# Patient Record
Sex: FEMALE | Race: WHITE | URBAN - METROPOLITAN AREA
[De-identification: names, ages, dates, MRNs, and addresses within clinical notes are randomized per-mention and may not be internally consistent; named-entity substitution may affect disease eponyms.]

---

## 2017-06-05 ENCOUNTER — HOSPITAL ENCOUNTER (EMERGENCY)
Facility: CLINIC | Age: 22
Discharge: HOME | End: 2017-06-06
Attending: FAMILY MEDICINE | Admitting: FAMILY MEDICINE
Payer: COMMERCIAL

## 2017-06-05 DIAGNOSIS — R55 SYNCOPE AND COLLAPSE: ICD-10-CM

## 2017-06-05 LAB
ALBUMIN SERPL-MCNC: 3.9 G/DL (ref 3.4–5)
ALBUMIN UR-MCNC: NEGATIVE MG/DL
ALP SERPL-CCNC: 53 U/L (ref 40–150)
ALT SERPL W P-5'-P-CCNC: 34 U/L (ref 0–50)
ANION GAP SERPL CALCULATED.3IONS-SCNC: 7 MMOL/L (ref 3–14)
APPEARANCE UR: CLEAR
APTT PPP: 25 SEC (ref 22–37)
AST SERPL W P-5'-P-CCNC: 61 U/L (ref 0–45)
BASOPHILS # BLD AUTO: 0 10E9/L (ref 0–0.2)
BASOPHILS NFR BLD AUTO: 0.3 %
BILIRUB SERPL-MCNC: 1.4 MG/DL (ref 0.2–1.3)
BILIRUB UR QL STRIP: NEGATIVE
BUN SERPL-MCNC: 11 MG/DL (ref 7–30)
CALCIUM SERPL-MCNC: 8.8 MG/DL (ref 8.5–10.1)
CHLORIDE SERPL-SCNC: 106 MMOL/L (ref 94–109)
CO2 SERPL-SCNC: 24 MMOL/L (ref 20–32)
COLOR UR AUTO: NORMAL
CREAT SERPL-MCNC: 0.66 MG/DL (ref 0.52–1.04)
DIFFERENTIAL METHOD BLD: NORMAL
EOSINOPHIL # BLD AUTO: 0.1 10E9/L (ref 0–0.7)
EOSINOPHIL NFR BLD AUTO: 1 %
ERYTHROCYTE [DISTWIDTH] IN BLOOD BY AUTOMATED COUNT: 12.9 % (ref 10–15)
GFR SERPL CREATININE-BSD FRML MDRD: ABNORMAL ML/MIN/1.7M2
GLUCOSE BLDC GLUCOMTR-MCNC: 88 MG/DL (ref 70–99)
GLUCOSE SERPL-MCNC: 80 MG/DL (ref 70–99)
GLUCOSE UR STRIP-MCNC: NEGATIVE MG/DL
HCG UR QL: NEGATIVE
HCT VFR BLD AUTO: 37.2 % (ref 35–47)
HGB BLD-MCNC: 12.3 G/DL (ref 11.7–15.7)
HGB UR QL STRIP: NEGATIVE
IMM GRANULOCYTES # BLD: 0 10E9/L (ref 0–0.4)
IMM GRANULOCYTES NFR BLD: 0.3 %
INR PPP: 1.02 (ref 0.86–1.14)
KETONES UR STRIP-MCNC: NEGATIVE MG/DL
LEUKOCYTE ESTERASE UR QL STRIP: NEGATIVE
LYMPHOCYTES # BLD AUTO: 1.3 10E9/L (ref 0.8–5.3)
LYMPHOCYTES NFR BLD AUTO: 18.6 %
MAGNESIUM SERPL-MCNC: 2.3 MG/DL (ref 1.6–2.3)
MCH RBC QN AUTO: 28.5 PG (ref 26.5–33)
MCHC RBC AUTO-ENTMCNC: 33.1 G/DL (ref 31.5–36.5)
MCV RBC AUTO: 86 FL (ref 78–100)
MONOCYTES # BLD AUTO: 0.6 10E9/L (ref 0–1.3)
MONOCYTES NFR BLD AUTO: 8.3 %
NEUTROPHILS # BLD AUTO: 5.2 10E9/L (ref 1.6–8.3)
NEUTROPHILS NFR BLD AUTO: 71.5 %
NITRATE UR QL: NEGATIVE
NRBC # BLD AUTO: 0 10*3/UL
NRBC BLD AUTO-RTO: 0 /100
PH UR STRIP: 6.5 PH (ref 5–7)
PLATELET # BLD AUTO: 190 10E9/L (ref 150–450)
POTASSIUM SERPL-SCNC: 4.7 MMOL/L (ref 3.4–5.3)
PROT SERPL-MCNC: 8.1 G/DL (ref 6.8–8.8)
RBC # BLD AUTO: 4.31 10E12/L (ref 3.8–5.2)
SODIUM SERPL-SCNC: 136 MMOL/L (ref 133–144)
SP GR UR STRIP: 1 (ref 1–1.03)
URN SPEC COLLECT METH UR: NORMAL
UROBILINOGEN UR STRIP-MCNC: NORMAL MG/DL (ref 0–2)
WBC # BLD AUTO: 7.2 10E9/L (ref 4–11)

## 2017-06-05 PROCEDURE — 85610 PROTHROMBIN TIME: CPT | Performed by: FAMILY MEDICINE

## 2017-06-05 PROCEDURE — 96361 HYDRATE IV INFUSION ADD-ON: CPT | Performed by: FAMILY MEDICINE

## 2017-06-05 PROCEDURE — 85730 THROMBOPLASTIN TIME PARTIAL: CPT | Performed by: FAMILY MEDICINE

## 2017-06-05 PROCEDURE — 81003 URINALYSIS AUTO W/O SCOPE: CPT | Performed by: FAMILY MEDICINE

## 2017-06-05 PROCEDURE — 93010 ELECTROCARDIOGRAM REPORT: CPT | Mod: Z6 | Performed by: FAMILY MEDICINE

## 2017-06-05 PROCEDURE — 93005 ELECTROCARDIOGRAM TRACING: CPT | Performed by: FAMILY MEDICINE

## 2017-06-05 PROCEDURE — 83735 ASSAY OF MAGNESIUM: CPT | Performed by: FAMILY MEDICINE

## 2017-06-05 PROCEDURE — 80053 COMPREHEN METABOLIC PANEL: CPT | Performed by: FAMILY MEDICINE

## 2017-06-05 PROCEDURE — 00000146 ZZHCL STATISTIC GLUCOSE BY METER IP

## 2017-06-05 PROCEDURE — 99285 EMERGENCY DEPT VISIT HI MDM: CPT | Mod: 25 | Performed by: FAMILY MEDICINE

## 2017-06-05 PROCEDURE — 25000128 H RX IP 250 OP 636: Performed by: FAMILY MEDICINE

## 2017-06-05 PROCEDURE — 85025 COMPLETE CBC W/AUTO DIFF WBC: CPT | Performed by: FAMILY MEDICINE

## 2017-06-05 PROCEDURE — 96360 HYDRATION IV INFUSION INIT: CPT | Performed by: FAMILY MEDICINE

## 2017-06-05 PROCEDURE — 81025 URINE PREGNANCY TEST: CPT | Performed by: FAMILY MEDICINE

## 2017-06-05 RX ORDER — LIDOCAINE 40 MG/G
CREAM TOPICAL
Status: DISCONTINUED | OUTPATIENT
Start: 2017-06-05 | End: 2017-06-06 | Stop reason: HOSPADM

## 2017-06-05 RX ORDER — SODIUM CHLORIDE 9 MG/ML
1000 INJECTION, SOLUTION INTRAVENOUS CONTINUOUS
Status: DISCONTINUED | OUTPATIENT
Start: 2017-06-05 | End: 2017-06-06 | Stop reason: HOSPADM

## 2017-06-05 RX ADMIN — SODIUM CHLORIDE 1000 ML: 9 INJECTION, SOLUTION INTRAVENOUS at 23:48

## 2017-06-05 ASSESSMENT — ENCOUNTER SYMPTOMS
HEADACHES: 0
COUGH: 0
FEVER: 0
SHORTNESS OF BREATH: 0
SEIZURES: 1
ABDOMINAL PAIN: 0
RHINORRHEA: 0
WEAKNESS: 1
LIGHT-HEADEDNESS: 1

## 2017-06-05 NOTE — ED AVS SNAPSHOT
[facility], [place], Emergency Department    [address] [place] [address]    Phone:  [phone]                                       Jinny Alarcon   MRN: [id number]    Department:  [facility], Emergency Department   Date of Visit:  6/5/2017           After Visit Summary Signature Page     I have received my discharge instructions, and my questions have been answered. I have discussed any challenges I see with this plan with the nurse or doctor.    ..........................................................................................................................................  Patient/Patient Representative Signature      ..........................................................................................................................................  Patient Representative Print Name and Relationship to Patient    ..................................................               ................................................  Date                                            Time    ..........................................................................................................................................  Reviewed by Signature/Title    ...................................................              ..............................................  Date                                                            Time

## 2017-06-05 NOTE — ED AVS SNAPSHOT
[facility], Emergency Department    [address] [facility] [address]    Phone:  [phone]                                       Jinny Alarcon   MRN: [id number]    Department:  [facility], Emergency Department   Date of Visit:  6/5/2017           Patient Information     Date Of Birth          1995        Your diagnoses for this visit were:     Syncope and collapse        You were seen by Juancho Jones MD.      Follow-up Information     Follow up with [facility].    Specialty:  Pediatrics    Contact information:    [address]  [place] [address] [phone]          Discharge Instructions       Home.  You were seen by neurology in the ER who felt symptoms were more from a fainting episode.  Ct head negative along with labs and ekg.  Recommend home with rest and make sure to eat well and encourage fluids.  Monitor symptoms.  Call MD if any recurrent problems at all or return.      Causes of Syncope  Syncope (fainting) has many causes. Sometimes it is not serious. In other cases, syncope is a sign of a heart problem. But treatment can help    When syncope is not serious  Your doctor may call your problem vasovagal syncope or orthostatic hypotension. These 2 types of syncope are not serious. They can be caused by:    Strong feelings, such as anxiety or fear. A nerve signal may briefly change your heart rate and lower your blood pressure too much.    Standing for too long. Standing may cause blood to pool in your legs. When this happens, your brain may not receive all the blood it needs.    Standing up too quickly. Your blood pressure may not adjust fast enough to changes in posture and may drop too low. Certain medications can also cause this problem.  When heart trouble causes syncope  A heart problem can decrease the amount of oxygen-rich blood that reaches the brain. Heart trouble can be serious and may even be fatal if left untreated:    A slow heart  rate. Electrical signals tell the chambers of the heart when to pump. But the signals may be slowed or blocked (heart block) as they travel on the heart s pathways. This can be caused by aging, scarred heart tissue, or damage from heart disease. When the heart rate slows, not enough blood is pumped.    A fast heart rate. Certain problems can make the heart race. For instance, after a heart attack, also known as acute myocardial infarction, or AMI, abnormal electrical signals may be created. These signals can make the heart suddenly beat very fast. The heart pumps before the chambers can fill with blood. So less blood reaches the brain and other parts of the body. Illegal drugs, certain medications, heart disease, or an inherited condition can also cause this.    A heart valve problem. Blood travels through the chambers of the heart as it is pumped. Heart valves open and close to help move blood in the right direction. But a valve may not open or close fully, if it s hardened or scarred. As a result, less blood is pumped through the heart to the brain and body.    4272-1889 The [employer]. [address] [place], [place] [address]. All rights reserved. This information is not intended as a substitute for professional medical care. Always follow your healthcare professional's instructions.          Treating Syncope: Prevention  If you have been told that your fainting is not caused by a heart problem, you can help prevent fainting. And you can learn to respond to your body s warning signs.     For your safety and the safety of others, limit your driving as instructed.         If you feel faint, lie down for a while. Sit down and put your head between your knees if you can't lie down.     If You Feel Faint    Know the warning signs of fainting: weakness, nausea, dimmed vision, sweating, lightheadedness, or a fast heartbeat.    Don t ignore or fight any signs that you may faint.    Lie down until you feel  better. Your symptoms should go away in about 20 to 30 minutes.  Small Changes Make a Big Difference    Sit near the aisle so you can leave if you feel faint.    Get up slowly after you have been lying down.    If prescribed, wear special stockings to keep blood from pooling in your legs.    If directed, add salt to your food to raise your blood pressure. Don t skip meals.    Don t stand for long. Shop when lines are short.    Drink water often, especially when exercising during hot weather.  The Role of Medications  Medications sometimes can play a role in both causing and preventing syncope.  Your medications may be changed or reduced. Blood pressure medications may cause fainting. Certain combinations of medications may also make you faint. And some nonprescription medications, herbs, and teas may cause symptoms, too. Tell your doctor about any medication you re taking and if you started using a new medication recently.  Medications may be prescribed. Taking certain medications can help prevent fainting. Your doctor can discuss these with you.     3447-6467 The [employer]. [address] [place] [address], [place] [address]. All rights reserved. This information is not intended as a substitute for professional medical care. Always follow your healthcare professional's instructions.          24 Hour Appointment Hotline       To make an appointment at any [facility], call [phone] ([phone]). If you don't have a family doctor or clinic, we will help you find one. [facility] clinics are conveniently located to serve the needs of you and your family.             Review of your medicines      Notice     You have not been prescribed any medications.            Procedures and tests performed during your visit     CBC with platelets differential    CT Head w/o Contrast    Cardiac Continuous Monitoring    Comprehensive metabolic panel    EKG 12-lead, tracing only    Glucose by meter    HCG qualitative  "urine    INR    Magnesium    Partial thromboplastin time    Peripheral IV catheter    Pulse oximetry nursing    UA reflex to Microscopic and Culture      Orders Needing Specimen Collection     None      Pending Results     Date and Time Order Name Status Description    2017 0046 CT Head w/o Contrast Preliminary     20175 EKG 12-lead, tracing only Preliminary             Pending Culture Results     No orders found for last 3 day(s).            Pending Results Instructions     If you had any lab results that were not finalized at the time of your Discharge, you can call the ED Lab Result RN at [phone]. You will be contacted by this team for any positive Lab results or changes in treatment. The nurses are available 7 days a week from 10A to 6:30P.  You can leave a message 24 hours per day and they will return your call.        Thank you for choosing [facility]       Thank you for choosing [facility] for your care. Our goal is always to provide you with excellent care. Hearing back from our patients is one way we can continue to improve our services. Please take a few minutes to complete the written survey that you may receive in the mail after you visit with us. Thank you!        [employer] Information     [employer] lets you send messages to your doctor, view your test results, renew your prescriptions, schedule appointments and more. To sign up, go to [email or web address] . Click on \"Log in\" on the left side of the screen, which will take you to the Welcome page. Then click on \"Sign up Now\" on the right side of the page.     You will be asked to enter the access code listed below, as well as some personal information. Please follow the directions to create your username and password.     Your access code is: [email or web address]  Expires: 2017  2:31 AM     Your access code will  in 90 days. If you need help or a new code, please call your [facility] clinic or [phone].        Care EveryWhere ID     This " is your Care EveryWhere ID. This could be used by other organizations to access your [facility] medical records  [id number]        After Visit Summary       This is your record. Keep this with you and show to your community pharmacist(s) and doctor(s) at your next visit.

## 2017-06-06 ENCOUNTER — APPOINTMENT (OUTPATIENT)
Dept: CT IMAGING | Facility: CLINIC | Age: 22
End: 2017-06-06
Attending: FAMILY MEDICINE
Payer: COMMERCIAL

## 2017-06-06 VITALS
BODY MASS INDEX: 19.33 KG/M2 | DIASTOLIC BLOOD PRESSURE: 76 MMHG | WEIGHT: 116 LBS | SYSTOLIC BLOOD PRESSURE: 117 MMHG | TEMPERATURE: 98.6 F | RESPIRATION RATE: 16 BRPM | HEIGHT: 65 IN | OXYGEN SATURATION: 99 % | HEART RATE: 75 BPM

## 2017-06-06 LAB — INTERPRETATION ECG - MUSE: NORMAL

## 2017-06-06 PROCEDURE — 70450 CT HEAD/BRAIN W/O DYE: CPT

## 2017-06-06 ASSESSMENT — ENCOUNTER SYMPTOMS
EYE REDNESS: 0
NERVOUS/ANXIOUS: 0
SLEEP DISTURBANCE: 0
BRUISES/BLEEDS EASILY: 0
NAUSEA: 0
ACTIVITY CHANGE: 1
DIARRHEA: 0
FLANK PAIN: 0
MYALGIAS: 0
DYSPHORIC MOOD: 0
DIFFICULTY URINATING: 0
APPETITE CHANGE: 1
VOMITING: 0
NECK STIFFNESS: 0
PALPITATIONS: 0
FACIAL SWELLING: 0
CHEST TIGHTNESS: 0
JOINT SWELLING: 0
DECREASED CONCENTRATION: 0
CONFUSION: 0
NECK PAIN: 0
DIAPHORESIS: 0
ARTHRALGIAS: 0
FATIGUE: 0
BLOOD IN STOOL: 0
COLOR CHANGE: 0

## 2017-06-06 NOTE — DISCHARGE INSTRUCTIONS
Home.  You were seen by neurology in the ER who felt symptoms were more from a fainting episode.  Ct head negative along with labs and ekg.  Recommend home with rest and make sure to eat well and encourage fluids.  Monitor symptoms.  Call MD if any recurrent problems at all or return.      Causes of Syncope  Syncope (fainting) has many causes. Sometimes it is not serious. In other cases, syncope is a sign of a heart problem. But treatment can help    When syncope is not serious  Your doctor may call your problem vasovagal syncope or orthostatic hypotension. These 2 types of syncope are not serious. They can be caused by:    Strong feelings, such as anxiety or fear. A nerve signal may briefly change your heart rate and lower your blood pressure too much.    Standing for too long. Standing may cause blood to pool in your legs. When this happens, your brain may not receive all the blood it needs.    Standing up too quickly. Your blood pressure may not adjust fast enough to changes in posture and may drop too low. Certain medications can also cause this problem.  When heart trouble causes syncope  A heart problem can decrease the amount of oxygen-rich blood that reaches the brain. Heart trouble can be serious and may even be fatal if left untreated:    A slow heart rate. Electrical signals tell the chambers of the heart when to pump. But the signals may be slowed or blocked (heart block) as they travel on the heart s pathways. This can be caused by aging, scarred heart tissue, or damage from heart disease. When the heart rate slows, not enough blood is pumped.    A fast heart rate. Certain problems can make the heart race. For instance, after a heart attack, also known as acute myocardial infarction, or AMI, abnormal electrical signals may be created. These signals can make the heart suddenly beat very fast. The heart pumps before the chambers can fill with blood. So less blood reaches the brain and other parts of the  body. Illegal drugs, certain medications, heart disease, or an inherited condition can also cause this.    A heart valve problem. Blood travels through the chambers of the heart as it is pumped. Heart valves open and close to help move blood in the right direction. But a valve may not open or close fully, if it s hardened or scarred. As a result, less blood is pumped through the heart to the brain and body.    7247-4593 [employer]. [address] [place] [address], [place] [address]. All rights reserved. This information is not intended as a substitute for professional medical care. Always follow your healthcare professional's instructions.          Treating Syncope: Prevention  If you have been told that your fainting is not caused by a heart problem, you can help prevent fainting. And you can learn to respond to your body s warning signs.     For your safety and the safety of others, limit your driving as instructed.         If you feel faint, lie down for a while. Sit down and put your head between your knees if you can't lie down.     If You Feel Faint    Know the warning signs of fainting: weakness, nausea, dimmed vision, sweating, lightheadedness, or a fast heartbeat.    Don t ignore or fight any signs that you may faint.    Lie down until you feel better. Your symptoms should go away in about 20 to 30 minutes.  Small Changes Make a Big Difference    Sit near the aisle so you can leave if you feel faint.    Get up slowly after you have been lying down.    If prescribed, wear special stockings to keep blood from pooling in your legs.    If directed, add salt to your food to raise your blood pressure. Don t skip meals.    Don t stand for long. Shop when lines are short.    Drink water often, especially when exercising during hot weather.  The Role of Medications  Medications sometimes can play a role in both causing and preventing syncope.  Your medications may be changed or reduced. Blood pressure  medications may cause fainting. Certain combinations of medications may also make you faint. And some nonprescription medications, herbs, and teas may cause symptoms, too. Tell your doctor about any medication you re taking and if you started using a new medication recently.  Medications may be prescribed. Taking certain medications can help prevent fainting. Your doctor can discuss these with you.     5666-6247 [employer]. [address] [place] [address], [place] [address]. All rights reserved. This information is not intended as a substitute for professional medical care. Always follow your healthcare professional's instructions.

## 2017-06-06 NOTE — ED PROVIDER NOTES
"  History     Chief Complaint   Patient presents with     Seizures     HPI  Jinny Alarcon is a 22 year old female who presents to the Emergency Department via EMS now here with her mother and father for evaluation after a possible seizure. This evening, the patient was at her apartment cooking when her right hand began to contract. She attributed this to cooking and using the knife with her right hand. She then developed ringing in her ears, lightheadedness and \"everything became bright.\" She called her sister and went to the hallway of her apartment. There she felt her legs weaken and bend. She states she remembers laying in the hallway and being somewhat coherent. When she came to there were people around her in the apartment complex. Witnesses state the patient was standing next to the hairs and fell back and began to convulse and her eyes rolled back. The patient states she does not remember this and does not feel like she had a seizure. When she woke, she felt at baseline. The patient did not eat today other than a cookie. She has never experienced these symptoms before. She denies headache, tongue injury, bladder or bowel incontinence, cough, cold symptoms, neck pain or any other concerns or complaints at this time. No history of seizures or heat diease.  Patient now feels fine. No cardiac sx or hx. Neg family hx.LMP wnl.       Past Medical History:   Diagnosis Date     NO ACTIVE PROBLEMS        Past Surgical History:   Procedure Laterality Date     NO HISTORY OF SURGERY         Family History   Problem Relation Age of Onset     DIABETES Father      Coronary Artery Disease No family hx of      Hyperlipidemia Mother      CEREBROVASCULAR DISEASE No family hx of      Breast Cancer Maternal Aunt      arely ge 50      Other - See Comments Sister      fabi       Social History   Substance Use Topics     Smoking status: Never Smoker     Smokeless tobacco: Never Used     Alcohol use 0.0 - 0.6 oz/week     0 " "- 1 Standard drinks or equivalent per week       No current facility-administered medications for this encounter.      No current outpatient prescriptions on file.        Allergies   Allergen Reactions     Penicillins      I have reviewed the Medications, Allergies, Past Medical and Surgical History, and Social History in the Epic system.    Review of Systems   Constitutional: Positive for activity change (? seizure ax earlier) and appetite change (decrease po today). Negative for diaphoresis, fatigue and fever.   HENT: Positive for tinnitus. Negative for congestion, facial swelling and rhinorrhea.    Eyes: Positive for visual disturbance (hpi). Negative for redness.   Respiratory: Negative for cough, chest tightness and shortness of breath.    Cardiovascular: Negative for chest pain, palpitations and leg swelling.   Gastrointestinal: Negative for abdominal pain, blood in stool, diarrhea, nausea and vomiting.   Genitourinary: Negative for difficulty urinating, flank pain, menstrual problem and vaginal bleeding.   Musculoskeletal: Negative for arthralgias, joint swelling, myalgias, neck pain and neck stiffness.   Skin: Negative for color change.   Neurological: Positive for seizures (possible), weakness (legs) and light-headedness. Negative for headaches.        Negative for bowel or bladder incontinence.   Hematological: Does not bruise/bleed easily.   Psychiatric/Behavioral: Negative for confusion, decreased concentration, dysphoric mood and sleep disturbance. The patient is not nervous/anxious.    All other systems reviewed and are negative.      Physical Exam   BP: 117/76  Pulse: 78  Temp: 98.6  F (37  C)  Resp: 16  Height: 165.1 cm (5' 5\")  Weight: 52.6 kg (116 lb)  SpO2: 100 %  Physical Exam   Constitutional: She is oriented to person, place, and time. She appears well-developed and well-nourished. She appears distressed.   Patient alert and oriented with mother and father present. Some anxiousness.   HENT: "   Head: Normocephalic and atraumatic.   Eyes: Conjunctivae and EOM are normal. Pupils are equal, round, and reactive to light. No scleral icterus.   Neck: Normal range of motion. Neck supple. No JVD present. No thyromegaly present.   Cardiovascular: Normal rate and regular rhythm.  Exam reveals no friction rub.    No murmur heard.  Pulmonary/Chest: No stridor. No respiratory distress. She has no wheezes. She has no rales.   Abdominal: She exhibits no distension. There is no tenderness. There is no rebound.   Musculoskeletal: She exhibits no edema, tenderness or deformity.   Neg homans     Neurological: She is alert and oriented to person, place, and time. She has normal reflexes. She displays normal reflexes. No cranial nerve deficit. She exhibits normal muscle tone. Coordination normal.   Skin: Skin is warm and dry. No rash noted. She is not diaphoretic. No erythema. No pallor.   Psychiatric: Her behavior is normal. Judgment and thought content normal.   Mild anxiousness but otherwise appropriate.   Nursing note and vitals reviewed.      ED Course     10:20 PM  The patient was seen and examined by Dr. Jones in [facility].     ED Course       Patient eval in the ER.  EKg without changes noted.  IV NS bolus given x 1.  Cbc and cmp wnl  ua neg  upt neg.  Neuro consult in the ER. Feel sx syncope and not seizure.  Mother and patient anxious of concern of intracranial cause and would feel more comfortable with CT scan of head which was done and noted wnl.  Patient comfortable going home and will monitor sx.  Possible physiological syncope.  Patient to follow up with md  Return if any recurrent sx.  OK home with mother.      Procedures             EKG Interpretation:      Interpreted by Juancho Jones  Time reviewed: 2248  Symptoms at time of EKG: seizure vs syncope   Rhythm: normal sinus   Rate: normal  Axis: normal  Ectopy: none  Conduction: normal  ST Segments/ T Waves: No ST-T wave changes  Q Waves: none  Comparison to  prior: No old EKG available    Clinical Impression: normal EKG          Critical Care time:  none               Labs Ordered and Resulted from Time of ED Arrival Up to the Time of Departure from the ED   COMPREHENSIVE METABOLIC PANEL - Abnormal; Notable for the following:        Result Value    Bilirubin Total 1.4 (*)     AST 61 (*)     All other components within normal limits   GLUCOSE BY METER   CBC WITH PLATELETS DIFFERENTIAL   INR   PARTIAL THROMBOPLASTIN TIME   MAGNESIUM   UA MACROSCOPIC WITH REFLEX TO MICRO AND CULTURE   HCG QUALITATIVE URINE   CARDIAC CONTINUOUS MONITORING   PULSE OXIMETRY NURSING   PERIPHERAL IV CATHETER     Results for orders placed or performed during the hospital encounter of 06/05/17   CT Head w/o Contrast    Narrative    CT HEAD W/O CONTRAST 6/6/2017 1:57 AM    Provided History: Syncope vs seizure    Comparison: None available.    Technique: Using multidetector thin collimation helical acquisition  technique, axial, coronal and sagittal CT images from the skull base  to the vertex were obtained without intravenous contrast.     Findings:    No intracranial hemorrhage, mass effect, or midline shift. The  ventricles are proportionate to the cerebral sulci. The gray to white  matter differentiation of the cerebral hemispheres is preserved. The  basal cisterns are patent.    The visualized paranasal sinuses are clear. The mastoid air cells are  clear.       Impression    Impression: No acute intracranial pathology.     I have personally reviewed the examination and initial interpretation  and I agree with the findings.    LIBBY RODRIGUEZ MD   Glucose by meter   Result Value Ref Range    Glucose 88 70 - 99 mg/dL   CBC with platelets differential   Result Value Ref Range    WBC 7.2 4.0 - 11.0 10e9/L    RBC Count 4.31 3.8 - 5.2 10e12/L    Hemoglobin 12.3 11.7 - 15.7 g/dL    Hematocrit 37.2 35.0 - 47.0 %    MCV 86 78 - 100 fl    MCH 28.5 26.5 - 33.0 pg    MCHC 33.1 31.5 - 36.5 g/dL    RDW 12.9  10.0 - 15.0 %    Platelet Count 190 150 - 450 10e9/L    Diff Method Automated Method     % Neutrophils 71.5 %    % Lymphocytes 18.6 %    % Monocytes 8.3 %    % Eosinophils 1.0 %    % Basophils 0.3 %    % Immature Granulocytes 0.3 %    Nucleated RBCs 0 0 /100    Absolute Neutrophil 5.2 1.6 - 8.3 10e9/L    Absolute Lymphocytes 1.3 0.8 - 5.3 10e9/L    Absolute Monocytes 0.6 0.0 - 1.3 10e9/L    Absolute Eosinophils 0.1 0.0 - 0.7 10e9/L    Absolute Basophils 0.0 0.0 - 0.2 10e9/L    Abs Immature Granulocytes 0.0 0 - 0.4 10e9/L    Absolute Nucleated RBC 0.0    INR   Result Value Ref Range    INR 1.02 0.86 - 1.14   Partial thromboplastin time   Result Value Ref Range    PTT 25 22 - 37 sec   Comprehensive metabolic panel   Result Value Ref Range    Sodium 136 133 - 144 mmol/L    Potassium 4.7 3.4 - 5.3 mmol/L    Chloride 106 94 - 109 mmol/L    Carbon Dioxide 24 20 - 32 mmol/L    Anion Gap 7 3 - 14 mmol/L    Glucose 80 70 - 99 mg/dL    Urea Nitrogen 11 7 - 30 mg/dL    Creatinine 0.66 0.52 - 1.04 mg/dL    GFR Estimate >90  Non  GFR Calc   >60 mL/min/1.7m2    GFR Estimate If Black >90   GFR Calc   >60 mL/min/1.7m2    Calcium 8.8 8.5 - 10.1 mg/dL    Bilirubin Total 1.4 (H) 0.2 - 1.3 mg/dL    Albumin 3.9 3.4 - 5.0 g/dL    Protein Total 8.1 6.8 - 8.8 g/dL    Alkaline Phosphatase 53 40 - 150 U/L    ALT 34 0 - 50 U/L    AST 61 (H) 0 - 45 U/L   Magnesium   Result Value Ref Range    Magnesium 2.3 1.6 - 2.3 mg/dL   UA reflex to Microscopic and Culture   Result Value Ref Range    Color Urine Light Yellow     Appearance Urine Clear     Glucose Urine Negative NEG mg/dL    Bilirubin Urine Negative NEG    Ketones Urine Negative NEG mg/dL    Specific Gravity Urine 1.004 1.003 - 1.035    Blood Urine Negative NEG    pH Urine 6.5 5.0 - 7.0 pH    Protein Albumin Urine Negative NEG mg/dL    Urobilinogen mg/dL Normal 0.0 - 2.0 mg/dL    Nitrite Urine Negative NEG    Leukocyte Esterase Urine Negative NEG    Source  "Midstream Urine    HCG qualitative urine   Result Value Ref Range    HCG Qual Urine Negative NEG   EKG 12-lead, tracing only   Result Value Ref Range    Interpretation ECG Click View Image link to view waveform and result        Consults  Neurology: Responded (06/05/17 7716)    Assessments & Plan (with Medical Decision Making)  23 yo nonpregnant female to er with \"spell\" today. No hx. Patient decrease po today. ? Syncope vs seizure. Labs and ekg and preg test neg. Neuro consult more syncope.  Head CT per family request and patient neg. Feel ok home with encourage fluids and rest and eating and to follow up with MD. No palpitations noted.           I have reviewed the nursing notes.    I have reviewed the findings, diagnosis, plan and need for follow up with the patient.    There are no discharge medications for this patient.      Final diagnoses:   Syncope and collapse     ILiana, am serving as a trained medical scribe to document services personally performed by Juancho Jones MD, based on the provider's statements to me.      Juancho FRAUSTO MD, was physically present and have reviewed and verified the accuracy of this note documented by Liana Deng.     6/5/2017   [facility] EMERGENCY DEPARTMENT    This note was created at least in part by the use of dragon voice dictation system. Inadvertent typographical errors may still exist.  Juancho Jones MD.         Juancho Jones MD  06/06/17 1615    "

## 2017-06-06 NOTE — ED NOTES
Patient BIBA ambulating after possible seizure. Patient has no history of seizures before. At 2030 this evening pt stated she heard ringing in her ears and her right hand started cramping. She lowered herself to the floor and lost consciousness. A neighbor told EMS they saw pt having seizure-like activity. From the time patient called 911 to when EMS arrived was 10 minutes and patient was alert and oriented at that time. Blood sugar 74 en route.

## 2017-06-06 NOTE — CONSULTS
[facility]  Neurology Consult     Jinny Alarcon MRN# [id number]   YOB: 1995 Age: 22 year old      Date of Admission: 6/5/2017    Primary care provider: [facility]    Requesting physician: Dr. Jones         Assessment and Recommendations:   Jinny Alarcon is a 22 year old female brought to ER after spell of loss of consciousness. Patient, family and EMS description of spell is highly suggestive of syncope. No post ictal confusion all per patient, sister and EMS. Possible convulsions were likely from syncope and only lasted for <10 seconds. She was cooking in warm, humid condition and didn't have hydration during the day.   - driving restrictions discussed  - no further neurologic work up indicated at this time. She should present back if she has second spell.     Patient discussed with Dr. Shine.    Niyah Ma, PGY-4  Neurology            Chief Complaint:   spell       History is obtained from the patient, family and medical record      Jinny Alarcon is a 22 year old female without significant PMH was brought to ER by EMS after a spell of loss of consciousness. She was cooking around 8:30 pm, and started feeling hazy, had cramping in right hand, ringing in ears, bright light in vision, lightheadedness which was persistent. She called her sister and 911. She asked for help by knocking her door, neighbors responded. Within 2 min she felt her legs were weak and fell backwards to the stairs. Bystanders reportedly saw her body tensing up and eyes rolled back, for 10 seconds. After which she started responding. Her sister was there in next 2 min and found her back to baseline but scared. EMS responded in 7-8 min and found her ambulatory, oriented x 3. Patient herself remembers falling and then waking up on the floor soon after. She denies any confusion when she regained her consciousness. Denies any tongue bite or  "incontinence.    Patient denies any recent illness. She had a black tea earlier and caribou beverage later, no other fluids. Didn't eat anything except cookies in evening.             Past Medical History:     Denies any           Past Surgical History:     Denies any          Social History:     Social History Main Topics     Smoking status: Never Smoker     Smokeless tobacco: Never Used     Alcohol use 0.0 - 0.6 oz/week     0 - 1 Standard drinks or equivalent per week     Drug use: No     Sexual activity: Yes     Social History Narrative    Single, no kids, non smoker, alcohol social , graduated from collage , looking for job now and taking summer course              Family History:     No h/o seizures in family. Reports uncle has h/o factor V mutation. Sister has h/o panic attacks.          Allergies:      Allergies   Allergen Reactions     Penicillins              Medications:     Facility Administered Medications as of 6/5/2017             lidocaine 1 % 1 mL 1 mL by Other route every hour as needed (mild pain with VAD insertion or accessing implanted port)    lidocaine (LMX4) kit Apply topically every hour as needed for pain (with VAD insertion or accessing implanted port.)    sodium chloride (PF) 0.9% PF flush 3 mL 3 mLs by Intracatheter route every hour as needed for line flush (for peripheral IV flush post IV meds)    sodium chloride (PF) 0.9% PF flush 3 mL 3 mLs by Intracatheter route every 8 hours    0.9% sodium chloride BOLUS Inject 1,000 mLs into the vein once    Linked Group 1:  \"Followed by\" Linked Group Details     0.9% sodium chloride infusion Inject 1,000 mLs into the vein continuous    Linked Group 1:  \"Followed by\" Linked Group Details                 Review of Systems:   The Review of Systems is negative other than noted in the HPI         Physical Exam:   /76  Pulse 78  Temp 98.6  F (37  C) (Oral)  Resp 16  Ht 1.651 m (5' 5\")  Wt 52.6 kg (116 lb)  LMP 05/08/2017 (Exact Date)  SpO2 " 100%  Breastfeeding? No  BMI 19.3 kg/m2   Physical Exam:   General: sitting in bed without any acute distress  Neurologic:   Mental Status Exam: Alert, awake and oriented to person, place and time. No dysarthria. Speech of normal fluency.   Cranial Nerves: PERRLA, EOMs intact, no nystagmus, facial movements symmetric, facial sensation intact to light touch, hearing intact to conversation, palate and uvula rise symmetrically, no deviation in uvula or tongue, trapezius and SCMs 5/5 bilaterally, tongue midline and fully mobile. No atrophy or fasciculations.    Motor: Normal tone in all four extremities, no atrophy or fasciculations. 5/5 strength bilaterally in shoulder abduction, elbow extensors and flexors, , hip flexors, knee extensors and flexors, dorsi- and plantarflexion. No tremors.   Sensory: Sensation intact to light touch on arms and legs bilaterally.    Coordination: Finger-nose-finger intact bilaterally.    Reflexes: 2+ and symmetric in triceps, biceps, brachioradialis, patellar, Achilles, and plantars downgoing bilaterally   Gait: Normal gait. Tandem gait normal.  Head: Normocephalic, atraumatic  Eyes: No conjunctival injection, no scleral icterus.   Mouth: No oral lesions, no erythema or exudate in the oropharynx  Respiratory: No increased work of breathing  Cardiovascular: RRR, No lower extremity edema  Abdomen: soft, non tender  Extremities: Warm, dry           Data:   CBC:  Lab Results   Component Value Date    WBC 7.2 06/05/2017     Lab Results   Component Value Date    RBC 4.31 06/05/2017     Lab Results   Component Value Date    HGB 12.3 06/05/2017     Lab Results   Component Value Date    HCT 37.2 06/05/2017     No components found for: MCT  Lab Results   Component Value Date    MCV 86 06/05/2017     Lab Results   Component Value Date    MCH 28.5 06/05/2017     Lab Results   Component Value Date    MCHC 33.1 06/05/2017     Lab Results   Component Value Date    RDW 12.9 06/05/2017     Lab  Results   Component Value Date     06/05/2017       Last Basic Metabolic Panel:  Lab Results   Component Value Date     06/05/2017      Lab Results   Component Value Date    POTASSIUM 4.7 06/05/2017     Lab Results   Component Value Date    CHLORIDE 106 06/05/2017     Lab Results   Component Value Date    UMESH 8.8 06/05/2017     Lab Results   Component Value Date    CO2 24 06/05/2017     Lab Results   Component Value Date    BUN 11 06/05/2017     Lab Results   Component Value Date    CR 0.66 06/05/2017     Lab Results   Component Value Date    GLC 80 06/05/2017